# Patient Record
Sex: FEMALE
[De-identification: names, ages, dates, MRNs, and addresses within clinical notes are randomized per-mention and may not be internally consistent; named-entity substitution may affect disease eponyms.]

---

## 2017-01-25 ENCOUNTER — HOSPITAL (OUTPATIENT)
Dept: OTHER | Age: 52
End: 2017-01-25
Attending: PSYCHIATRY & NEUROLOGY

## 2017-01-26 ENCOUNTER — HOSPITAL (OUTPATIENT)
Dept: OTHER | Age: 52
End: 2017-01-26
Attending: PSYCHIATRY & NEUROLOGY

## 2017-02-01 ENCOUNTER — HOSPITAL (OUTPATIENT)
Dept: OTHER | Age: 52
End: 2017-02-01
Attending: PSYCHIATRY & NEUROLOGY

## 2017-03-03 ENCOUNTER — CHARTING TRANS (OUTPATIENT)
Dept: OTHER | Age: 52
End: 2017-03-03

## 2017-03-03 ASSESSMENT — PAIN SCALES - GENERAL: PAINLEVEL_OUTOF10: 5

## 2017-07-05 ENCOUNTER — OFFICE VISIT (OUTPATIENT)
Dept: INTERNAL MEDICINE CLINIC | Facility: HOSPITAL | Age: 52
End: 2017-07-05
Attending: EMERGENCY MEDICINE

## 2017-07-05 DIAGNOSIS — Z00.00 WELLNESS EXAMINATION: Primary | ICD-10-CM

## 2017-07-05 LAB
MUV IGG SER IA-ACNC: 198 AU/ML (ref 11–?)
VZV IGG SER IA-ACNC: 576.7 (ref 165–?)

## 2017-07-05 PROCEDURE — 86787 VARICELLA-ZOSTER ANTIBODY: CPT

## 2017-07-05 PROCEDURE — 86735 MUMPS ANTIBODY: CPT

## 2018-02-13 ENCOUNTER — OFFICE VISIT (OUTPATIENT)
Dept: FAMILY MEDICINE CLINIC | Facility: CLINIC | Age: 53
End: 2018-02-13

## 2018-02-13 VITALS
WEIGHT: 166 LBS | DIASTOLIC BLOOD PRESSURE: 88 MMHG | TEMPERATURE: 98 F | HEART RATE: 82 BPM | SYSTOLIC BLOOD PRESSURE: 130 MMHG | RESPIRATION RATE: 18 BRPM

## 2018-02-13 DIAGNOSIS — J02.9 PHARYNGITIS, UNSPECIFIED ETIOLOGY: Primary | ICD-10-CM

## 2018-02-13 LAB
CONTROL LINE PRESENT WITH A CLEAR BACKGROUND (YES/NO): YES YES/NO
STREP GRP A CUL-SCR: NEGATIVE

## 2018-02-13 PROCEDURE — 99203 OFFICE O/P NEW LOW 30 MIN: CPT | Performed by: NURSE PRACTITIONER

## 2018-02-13 PROCEDURE — 87880 STREP A ASSAY W/OPTIC: CPT | Performed by: NURSE PRACTITIONER

## 2018-02-13 RX ORDER — HYDROXYZINE 50 MG/1
50 TABLET, FILM COATED ORAL EVERY 6 HOURS PRN
COMMUNITY
Start: 2017-12-07 | End: 2018-11-16 | Stop reason: CLARIF

## 2018-02-13 RX ORDER — FLUOXETINE HYDROCHLORIDE 20 MG/1
60 CAPSULE ORAL NIGHTLY
COMMUNITY
Start: 2017-12-07 | End: 2018-12-06

## 2018-02-13 NOTE — PROGRESS NOTES
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 HPI:   Anthony Hensley is a 46year old female presents to clinic with complaint of sore throat. Patient has had for 5 days.    Patient reports fol EXTREMITIES: no cyanosis, clubbing or edema  LYMPH: no anterior cervical lymphadenopathy,       Recent Results (from the past 24 hour(s))  -STREP A ASSAY W/OPTIC   Collection Time: 02/13/18 10:04 AM   Result Value Ref Range   STREP GRP A CUL-SCR Negative N · Suck on throat lozenges, cough drops, hard candy, ice chips, or frozen fruit-juice bars. Use the sugar-free versions if your diet or medical condition requires them. Gargle to ease irritation  Gargling every hour or 2 can ease irritation.  Try gargling w

## 2018-11-05 VITALS
SYSTOLIC BLOOD PRESSURE: 130 MMHG | RESPIRATION RATE: 12 BRPM | WEIGHT: 158.1 LBS | DIASTOLIC BLOOD PRESSURE: 90 MMHG | HEIGHT: 66 IN | BODY MASS INDEX: 25.41 KG/M2 | HEART RATE: 80 BPM | TEMPERATURE: 98.3 F

## 2018-11-16 ENCOUNTER — APPOINTMENT (OUTPATIENT)
Dept: CT IMAGING | Facility: HOSPITAL | Age: 53
DRG: 392 | End: 2018-11-16
Attending: EMERGENCY MEDICINE
Payer: COMMERCIAL

## 2018-11-16 ENCOUNTER — HOSPITAL ENCOUNTER (INPATIENT)
Facility: HOSPITAL | Age: 53
LOS: 1 days | Discharge: HOME | DRG: 392 | End: 2018-11-19
Attending: EMERGENCY MEDICINE | Admitting: HOSPITALIST
Payer: COMMERCIAL

## 2018-11-16 ENCOUNTER — APPOINTMENT (OUTPATIENT)
Dept: GENERAL RADIOLOGY | Facility: HOSPITAL | Age: 53
DRG: 392 | End: 2018-11-16
Payer: COMMERCIAL

## 2018-11-16 DIAGNOSIS — R07.9 CHEST PAIN OF UNCERTAIN ETIOLOGY: Primary | ICD-10-CM

## 2018-11-16 PROBLEM — R79.89 AZOTEMIA: Status: ACTIVE | Noted: 2018-11-16

## 2018-11-16 PROCEDURE — 71045 X-RAY EXAM CHEST 1 VIEW: CPT | Performed by: EMERGENCY MEDICINE

## 2018-11-16 PROCEDURE — 99220 INITIAL OBSERVATION CARE,LEVL III: CPT | Performed by: HOSPITALIST

## 2018-11-16 PROCEDURE — 71275 CT ANGIOGRAPHY CHEST: CPT | Performed by: EMERGENCY MEDICINE

## 2018-11-16 RX ORDER — ENOXAPARIN SODIUM 100 MG/ML
40 INJECTION SUBCUTANEOUS DAILY
Status: DISCONTINUED | OUTPATIENT
Start: 2018-11-16 | End: 2018-11-19

## 2018-11-16 RX ORDER — ASPIRIN 81 MG/1
324 TABLET, CHEWABLE ORAL ONCE
Status: COMPLETED | OUTPATIENT
Start: 2018-11-16 | End: 2018-11-16

## 2018-11-16 RX ORDER — BUPROPION HYDROCHLORIDE 100 MG/1
300 TABLET ORAL ONCE
Status: DISCONTINUED | OUTPATIENT
Start: 2018-11-17 | End: 2018-11-16

## 2018-11-16 RX ORDER — METOCLOPRAMIDE HYDROCHLORIDE 5 MG/ML
10 INJECTION INTRAMUSCULAR; INTRAVENOUS EVERY 8 HOURS PRN
Status: DISCONTINUED | OUTPATIENT
Start: 2018-11-16 | End: 2018-11-19

## 2018-11-16 RX ORDER — BUPROPION HYDROCHLORIDE 150 MG/1
150 TABLET, EXTENDED RELEASE ORAL 2 TIMES DAILY
Status: DISCONTINUED | OUTPATIENT
Start: 2018-11-16 | End: 2018-11-19

## 2018-11-16 RX ORDER — SODIUM CHLORIDE 9 MG/ML
INJECTION, SOLUTION INTRAVENOUS CONTINUOUS
Status: ACTIVE | OUTPATIENT
Start: 2018-11-16 | End: 2018-11-16

## 2018-11-16 RX ORDER — FAMOTIDINE 20 MG/1
20 TABLET ORAL 2 TIMES DAILY
Status: DISCONTINUED | OUTPATIENT
Start: 2018-11-16 | End: 2018-11-17

## 2018-11-16 RX ORDER — BUPROPION HYDROCHLORIDE 100 MG/1
300 TABLET ORAL ONCE
COMMUNITY
End: 2018-11-16 | Stop reason: CLARIF

## 2018-11-16 RX ORDER — ASPIRIN 325 MG
325 TABLET ORAL DAILY
Status: DISCONTINUED | OUTPATIENT
Start: 2018-11-17 | End: 2018-11-19

## 2018-11-16 RX ORDER — ONDANSETRON 2 MG/ML
4 INJECTION INTRAMUSCULAR; INTRAVENOUS EVERY 4 HOURS PRN
Status: DISCONTINUED | OUTPATIENT
Start: 2018-11-16 | End: 2018-11-16

## 2018-11-16 RX ORDER — ONDANSETRON 2 MG/ML
4 INJECTION INTRAMUSCULAR; INTRAVENOUS EVERY 6 HOURS PRN
Status: DISCONTINUED | OUTPATIENT
Start: 2018-11-16 | End: 2018-11-19

## 2018-11-16 RX ORDER — METOPROLOL TARTRATE 5 MG/5ML
5 INJECTION INTRAVENOUS ONCE
Status: DISCONTINUED | OUTPATIENT
Start: 2018-11-16 | End: 2018-11-19

## 2018-11-16 RX ORDER — ACETAMINOPHEN 325 MG/1
650 TABLET ORAL EVERY 6 HOURS PRN
Status: DISCONTINUED | OUTPATIENT
Start: 2018-11-16 | End: 2018-11-19

## 2018-11-16 RX ORDER — BUPROPION HYDROCHLORIDE 300 MG/1
300 TABLET ORAL NIGHTLY
COMMUNITY
End: 2019-02-22

## 2018-11-16 RX ORDER — FLUOXETINE HYDROCHLORIDE 20 MG/1
20 CAPSULE ORAL DAILY
Status: DISCONTINUED | OUTPATIENT
Start: 2018-11-16 | End: 2018-11-19

## 2018-11-16 RX ORDER — NITROGLYCERIN 0.4 MG/1
0.4 TABLET SUBLINGUAL EVERY 5 MIN PRN
Status: DISCONTINUED | OUTPATIENT
Start: 2018-11-16 | End: 2018-11-19

## 2018-11-16 NOTE — ED INITIAL ASSESSMENT (HPI)
Patient presents with complaints of chest pain for a few days. She also reports she has been hypertensive at home as well. She describes the pain as a pressure sensation and almost a sensation of foreign body in her throat.  She states she recently had stre

## 2018-11-16 NOTE — ED PROVIDER NOTES
Patient Seen in: [facility] Emergency Department    History   Patient presents with:  Chest Pain Angina (cardiovascular)    Stated Complaint: chest pain    HPI  This is a 59-year-old female who arrives here with complaints of chest pain.   The patien (!) 168/84   Pulse 79   Temp 98.7 °F (37.1 °C) (Temporal)   Resp 15   Ht 165.1 cm (5' 5\")   Wt 70.3 kg   SpO2 100%   BMI 25.79 kg/m²         Physical Exam    General: . Male no respiratory distress  The patient is in no respiratory distress.  The patient ---------                               -----------         ------                     CBC W/ DIFFERENTIAL[[id number]]          Abnormal            Final result                 Please view results for these tests on the individual orders. BONES:  No acute fractures. Mild hypertrophic spurring of the thoracic spine. CONCLUSION:  1. No acute pulmonary embolus 2. There is a 4 mm right upper lobe nodule. If patient is at low risk, no further workup is needed.  If patient is at high risk

## 2018-11-17 PROCEDURE — 99225 SUBSEQUENT OBSERVATION CARE: CPT | Performed by: STUDENT IN AN ORGANIZED HEALTH CARE EDUCATION/TRAINING PROGRAM

## 2018-11-17 RX ORDER — PANTOPRAZOLE SODIUM 40 MG/1
40 TABLET, DELAYED RELEASE ORAL
Status: DISCONTINUED | OUTPATIENT
Start: 2018-11-17 | End: 2018-11-19

## 2018-11-17 RX ORDER — MAGNESIUM HYDROXIDE/ALUMINUM HYDROXICE/SIMETHICONE 120; 1200; 1200 MG/30ML; MG/30ML; MG/30ML
30 SUSPENSION ORAL 4 TIMES DAILY PRN
Status: DISCONTINUED | OUTPATIENT
Start: 2018-11-17 | End: 2018-11-19

## 2018-11-17 RX ORDER — MELATONIN
3 NIGHTLY
Status: DISCONTINUED | OUTPATIENT
Start: 2018-11-17 | End: 2018-11-17

## 2018-11-17 RX ORDER — MELATONIN
3 NIGHTLY PRN
Status: DISCONTINUED | OUTPATIENT
Start: 2018-11-17 | End: 2018-11-19

## 2018-11-17 NOTE — PLAN OF CARE
Difficult to discern whether she is experiencing chest pain or epigastric GI pain.   Pain was a 5/10 this morning and given the nature that she was nauseous with it I thought it was best to offer her the Marajesh Lucas ordered and Zofran instead of fantasma

## 2018-11-17 NOTE — CONSULTS
[facility]  Report of Consultation    Paula Yanes Patient Status:  Observation    1965 MRN [id number]   [facility] 8NE-A Attending Nanci Funes MD   Hosp Day # 0 PCP No primary care provider on file.      Reason for Consult mg, 20 mg, Oral, Daily  •  aspirin tab 325 mg, 325 mg, Oral, Daily  •  nitroGLYCERIN (NITROSTAT) SL tab 0.4 mg, 0.4 mg, Sublingual, Q5 Min PRN  •  Enoxaparin Sodium (LOVENOX) 40 MG/0.4ML injection 40 mg, 40 mg, Subcutaneous, Daily  •  acetaminophen (TYLENO CA 8.9 11/17/2018    ALB 3.8 11/16/2018    ALKPHO 61 11/16/2018    BILT 0.5 11/16/2018    TP 7.6 11/16/2018    AST 20 11/16/2018    ALT 33 11/16/2018    TSH 0.740 11/17/2018     11/17/2018    DDIMER 0.51 11/16/2018    TROP <0.046 11/17/2018     A

## 2018-11-17 NOTE — PROGRESS NOTES
[facility]  Progress Note     Rhea Hendrix Patient Status:  Observation    1965 MRN [id number]   [facility] 8NE-A Attending Dung Coelho MD   Hosp Day # 0 PCP No primary care provider on file.      Chief Complaint: CP/ epiga Blocker)   • Pantoprazole Sodium  40 mg Oral QAM AC   • metoprolol Tartrate  5 mg Intravenous Once   • FLUoxetine HCl  20 mg Oral Daily   • aspirin  325 mg Oral Daily   • enoxaparin  40 mg Subcutaneous Daily   • famoTIDine  20 mg Oral BID   • BuPROPion HCl

## 2018-11-17 NOTE — H&P
[facility]  History and Physical     Melba Peabody Patient Status:  Emergency    1965 MRN [id number]   Location [address] Attending Nir Yuan MD   Hosp Day # 0 PCP No primary care provider on file.      C (70.3 kg)   SpO2 100%   BMI 25.79 kg/m²   General: No acute distress. Alert and oriented x 3. HEENT: Normocephalic atraumatic. Moist mucous membranes. EOM-I. PERRLA. Anicteric. Neck: No lymphadenopathy. No JVD. No carotid bruits.   Respiratory: Clear to a

## 2018-11-18 ENCOUNTER — APPOINTMENT (OUTPATIENT)
Dept: CV DIAGNOSTICS | Facility: HOSPITAL | Age: 53
DRG: 392 | End: 2018-11-18
Attending: INTERNAL MEDICINE
Payer: COMMERCIAL

## 2018-11-18 ENCOUNTER — APPOINTMENT (OUTPATIENT)
Dept: ULTRASOUND IMAGING | Facility: HOSPITAL | Age: 53
DRG: 392 | End: 2018-11-18
Attending: INTERNAL MEDICINE
Payer: COMMERCIAL

## 2018-11-18 PROCEDURE — 99225 SUBSEQUENT OBSERVATION CARE: CPT | Performed by: STUDENT IN AN ORGANIZED HEALTH CARE EDUCATION/TRAINING PROGRAM

## 2018-11-18 PROCEDURE — 93306 TTE W/DOPPLER COMPLETE: CPT | Performed by: INTERNAL MEDICINE

## 2018-11-18 PROCEDURE — 76700 US EXAM ABDOM COMPLETE: CPT | Performed by: INTERNAL MEDICINE

## 2018-11-18 NOTE — PROGRESS NOTES
[facility]  Progress Note    Earl Govea Patient Status:  Observation    1965 MRN [id number]   [facility] 8NE-A Attending Breezy Green MD   Hosp Day # 0 PCP No primary care provider on file.        Assessment and Plan:  Kamilla Clear  Abdomen: Soft, non-tender. Extremities: no edema  Neurologic: Alert and oriented, normal affect. Skin: Warm and dry.      Laboratory/Data:    Labs:       Medications:    Current Facility-Administered Medications:  Alum & Mag Hydroxide-Simeth (MAAL

## 2018-11-18 NOTE — CONSULTS
[address]  Report of GI Consultation    Chen Rossi Patient Status:  Observation    1965 MRN [id number]   [facility] 8NE-A Attending Ortiz Orellana MD   Hosp Day # 0 PCP No primary care provider on file.      Date of Admission: (NITROSTAT) SL tab 0.4 mg 0.4 mg Sublingual Q5 Min PRN   Enoxaparin Sodium (LOVENOX) 40 MG/0.4ML injection 40 mg 40 mg Subcutaneous Daily   acetaminophen (TYLENOL) tab 650 mg 650 mg Oral Q6H PRN   ondansetron HCl (ZOFRAN) injection 4 mg 4 mg Intravenous Q6 4.2  5.1       Imaging:  Ct Angiography, Chest (cpt=71275)    Result Date: 11/16/2018  CONCLUSION:  1. No acute pulmonary embolus 2. There is a 4 mm right upper lobe nodule. If patient is at low risk, no further workup is needed.  If patient is at high r

## 2018-11-18 NOTE — PROGRESS NOTES
Spoke to Dr. Evelio Portillo regarding results of abdominal US. Relayed the results of gallbladder polyps, largest measuring of 5mm and fatty infiltration of the liver to Dr. Tato Yepez as requested. Gretchen has no further recommendations from his standpoint.   She can h

## 2018-11-18 NOTE — PLAN OF CARE
Pt denies pain. Pt is A & O x 4. Pt is NSR on tele, S1 and S2 present, heart rate steady and pt denies cardiac symptoms. Lungs are clear bilaterally in upper and lower lobes. Abdomen soft, non-tender and round.  Last BM on 11/17/18 and continuing to monitor

## 2018-11-19 ENCOUNTER — APPOINTMENT (OUTPATIENT)
Dept: CV DIAGNOSTICS | Facility: HOSPITAL | Age: 53
DRG: 392 | End: 2018-11-19
Attending: INTERNAL MEDICINE
Payer: COMMERCIAL

## 2018-11-19 ENCOUNTER — APPOINTMENT (OUTPATIENT)
Dept: CT IMAGING | Facility: HOSPITAL | Age: 53
DRG: 392 | End: 2018-11-19
Attending: NURSE PRACTITIONER
Payer: COMMERCIAL

## 2018-11-19 VITALS
DIASTOLIC BLOOD PRESSURE: 72 MMHG | HEIGHT: 65 IN | TEMPERATURE: 98 F | OXYGEN SATURATION: 98 % | SYSTOLIC BLOOD PRESSURE: 133 MMHG | HEART RATE: 58 BPM | WEIGHT: 176.56 LBS | BODY MASS INDEX: 29.42 KG/M2 | RESPIRATION RATE: 18 BRPM

## 2018-11-19 PROCEDURE — 75574 CT ANGIO HRT W/3D IMAGE: CPT | Performed by: NURSE PRACTITIONER

## 2018-11-19 PROCEDURE — 78452 HT MUSCLE IMAGE SPECT MULT: CPT | Performed by: INTERNAL MEDICINE

## 2018-11-19 PROCEDURE — 99239 HOSP IP/OBS DSCHRG MGMT >30: CPT | Performed by: STUDENT IN AN ORGANIZED HEALTH CARE EDUCATION/TRAINING PROGRAM

## 2018-11-19 PROCEDURE — 93017 CV STRESS TEST TRACING ONLY: CPT | Performed by: INTERNAL MEDICINE

## 2018-11-19 PROCEDURE — 93018 CV STRESS TEST I&R ONLY: CPT | Performed by: INTERNAL MEDICINE

## 2018-11-19 RX ORDER — METOPROLOL TARTRATE 100 MG/1
100 TABLET ORAL ONCE AS NEEDED
Status: COMPLETED | OUTPATIENT
Start: 2018-11-19 | End: 2018-11-19

## 2018-11-19 RX ORDER — METOPROLOL TARTRATE 5 MG/5ML
5 INJECTION INTRAVENOUS
Status: DISCONTINUED | OUTPATIENT
Start: 2018-11-19 | End: 2018-11-19

## 2018-11-19 RX ORDER — METOPROLOL TARTRATE 100 MG/1
100 TABLET ORAL ONCE
Status: DISCONTINUED | OUTPATIENT
Start: 2018-11-19 | End: 2018-11-19

## 2018-11-19 RX ORDER — ALPRAZOLAM 0.25 MG/1
0.25 TABLET ORAL ONCE AS NEEDED
Status: DISCONTINUED | OUTPATIENT
Start: 2018-11-20 | End: 2018-11-19

## 2018-11-19 RX ORDER — NITROGLYCERIN 0.4 MG/1
TABLET SUBLINGUAL
Status: COMPLETED
Start: 2018-11-19 | End: 2018-11-19

## 2018-11-19 RX ORDER — ALPRAZOLAM 0.25 MG/1
0.25 TABLET ORAL ONCE
Status: COMPLETED | OUTPATIENT
Start: 2018-11-19 | End: 2018-11-19

## 2018-11-19 RX ORDER — ATORVASTATIN CALCIUM 20 MG/1
20 TABLET, FILM COATED ORAL NIGHTLY
Qty: 30 TABLET | Refills: 5 | Status: SHIPPED | OUTPATIENT
Start: 2018-11-19

## 2018-11-19 RX ORDER — DILTIAZEM HYDROCHLORIDE 5 MG/ML
10 INJECTION INTRAVENOUS SEE ADMIN INSTRUCTIONS
Status: DISCONTINUED | OUTPATIENT
Start: 2018-11-19 | End: 2018-11-19 | Stop reason: HOSPADM

## 2018-11-19 RX ORDER — PANTOPRAZOLE SODIUM 40 MG/1
40 TABLET, DELAYED RELEASE ORAL
Qty: 30 TABLET | Refills: 1 | Status: SHIPPED | OUTPATIENT
Start: 2018-11-19 | End: 2019-01-18

## 2018-11-19 RX ORDER — METOPROLOL TARTRATE 50 MG/1
50 TABLET, FILM COATED ORAL ONCE AS NEEDED
Status: COMPLETED | OUTPATIENT
Start: 2018-11-19 | End: 2018-11-19

## 2018-11-19 RX ORDER — ASPIRIN 81 MG/1
81 TABLET ORAL DAILY
Qty: 30 TABLET | Refills: 5 | Status: SHIPPED | OUTPATIENT
Start: 2018-11-19

## 2018-11-19 RX ORDER — METOPROLOL TARTRATE 5 MG/5ML
INJECTION INTRAVENOUS
Status: DISCONTINUED
Start: 2018-11-19 | End: 2018-11-19 | Stop reason: WASHOUT

## 2018-11-19 RX ORDER — NITROGLYCERIN 0.4 MG/1
0.4 TABLET SUBLINGUAL ONCE
Status: COMPLETED | OUTPATIENT
Start: 2018-11-19 | End: 2018-11-19

## 2018-11-19 RX ORDER — NITROGLYCERIN 0.4 MG/1
0.4 TABLET SUBLINGUAL ONCE
Status: DISCONTINUED | OUTPATIENT
Start: 2018-11-19 | End: 2018-11-19 | Stop reason: HOSPADM

## 2018-11-19 RX ORDER — METOPROLOL TARTRATE 50 MG/1
50 TABLET, FILM COATED ORAL ONCE
Status: DISCONTINUED | OUTPATIENT
Start: 2018-11-20 | End: 2018-11-19

## 2018-11-19 RX ORDER — METOPROLOL TARTRATE 5 MG/5ML
5 INJECTION INTRAVENOUS SEE ADMIN INSTRUCTIONS
Status: DISCONTINUED | OUTPATIENT
Start: 2018-11-19 | End: 2018-11-19 | Stop reason: HOSPADM

## 2018-11-19 RX ORDER — METOPROLOL TARTRATE 50 MG/1
50 TABLET, FILM COATED ORAL ONCE
Status: DISCONTINUED | OUTPATIENT
Start: 2018-11-19 | End: 2018-11-19

## 2018-11-19 RX ORDER — METOPROLOL TARTRATE 100 MG/1
100 TABLET ORAL ONCE
Status: DISCONTINUED | OUTPATIENT
Start: 2018-11-20 | End: 2018-11-19

## 2018-11-19 RX ORDER — METOPROLOL TARTRATE 50 MG/1
50 TABLET, FILM COATED ORAL ONCE AS NEEDED
Status: DISCONTINUED | OUTPATIENT
Start: 2018-11-20 | End: 2018-11-19

## 2018-11-19 RX ORDER — MIDAZOLAM HYDROCHLORIDE 1 MG/ML
1 INJECTION INTRAMUSCULAR; INTRAVENOUS ONCE
Status: DISCONTINUED | OUTPATIENT
Start: 2018-11-19 | End: 2018-11-19

## 2018-11-19 NOTE — IMAGING NOTE
Olga Lidia Pickard RN instructed pt can eat, drink plenty of water, hold caffeine (decaf and chocolate) x 12 hours, may take all meds (including beta blockers), instructed RN to screen for claustrophobia/anxiety and give xanax if needed now, approximate time for scan

## 2018-11-19 NOTE — PAYOR COMM NOTE
--------------  ADMISSION REVIEW     Payor: Dottie HUTTON EPO  Subscriber #:  [id number]  Authorization Number: N/A    Admit date: 11/19/18  Admit time: 8382       Admitting Physician: Bhumika Rose MD  Attending Physician:  Katt Muñoz MD  [facility] Exam     ED Triage Vitals   BP 11/16/18 1739 (!) 180/98   Pulse 11/16/18 1739 81   Resp 11/16/18 1739 14   Temp 11/16/18 1743 98.7 °F (37.1 °C)   Temp src 11/16/18 1743 Temporal   SpO2 11/16/18 1739 100 %   O2 Device 11/16/18 1739 None (Room air)     Allie are some nonspecific ST changes anterior laterally    Admission disposition: 11/16/2018  7:26 PM  The patient's troponin was negative, comprehensive shows no acute pathology CBC was normal, d-dimer was borderline elevated therefore CT chest was ordered diagnosis)    Disposition:  Admit  11/16/2018  7:26 pm    Present on Admission  Date Reviewed: 2/13/2018          ICD-10-CM Noted POA    Azotemia R79.89 11/16/2018 Yes    Chest pain of uncertain etiology E63.79 11/16/2018 Unknown     Signed by Brown Malagon noted in the HPI. Physical Exam:    BP (!) 168/84   Pulse 79   Temp 98.7 °F (37.1 °C) (Temporal)   Resp 15   Ht 165.1 cm (5' 5\")   Wt 155 lb (70.3 kg)   SpO2 100%   BMI 25.79 kg/m²    General: No acute distress. Alert and oriented x 3.   HEENT: Normocep pulse 70, temperature 98.2 °F (36.8 °C), temperature source Oral, resp. rate 16, height 65\", weight 175 lb 14.8 oz, SpO2 98 %  Impression:   1. Epigastric pain, post-prandial, improving with PPI  2. Chest pain     Recommendations:  3.  Tolerating a regular tab 50 mg     Date Action Dose Route User    11/19/2018 1514 Given 50 mg Oral Alexa Montano, RN      nitroGLYCERIN (NITROSTAT) SL tab 0.4 mg     Date Action Dose Route User    11/19/2018 1629 Given 0.4 mg Sublingual Juan Pham, RN      Pantoprazole

## 2018-11-19 NOTE — PLAN OF CARE
Pt denies pain. Pt is A & O x 4. Pt is NSR/ SB on tele, S1 and S2 present, heart rate steady and pt denies cardiac symptoms. Lungs clear bilaterally in upper and lower lobes. Abdomen soft, non-tender and round.  Last BM on 11/17/18 and continuing to monitor

## 2018-11-19 NOTE — PROGRESS NOTES
[facility]  Progress Note     Matt Rivero Patient Status:  Observation    1965 MRN [id number]   [facility] 8NE-A Attending Mazin Erickson MD   Hosp Day # 0 PCP No primary care provider on file.      Chief Complaint: CP/ epiga HCl  20 mg Oral Daily   • aspirin  325 mg Oral Daily   • enoxaparin  40 mg Subcutaneous Daily   • BuPROPion HCl ER (SR)  150 mg Oral BID       ASSESSMENT / PLAN:     1. Chest pain vs abdominal pain  2. Hypertension     Plan of care:    If stress test withou

## 2018-11-19 NOTE — PROGRESS NOTES
[facility]  Progress Note     Paula Yanes Patient Status:  Observation    1965 MRN [id number]   [facility] 8NE-A Attending Nanci Funes MD   Hosp Day # 0 PCP No primary care provider on file.      Chief Complaint: CP/ epiga metoprolol Tartrate  5 mg Intravenous Once   • FLUoxetine HCl  20 mg Oral Daily   • aspirin  325 mg Oral Daily   • enoxaparin  40 mg Subcutaneous Daily   • BuPROPion HCl ER (SR)  150 mg Oral BID       ASSESSMENT / PLAN:     1.  Chest pain vs abdominal pain

## 2018-11-19 NOTE — BH PROGRESS NOTE
[address] [facility] Resource Referral Counselor Note    [facility] Patient Status:  Inpatient    1965 MRN [id number]   [facility] 8NE-A Attending Morales Moore MD   Hosp Day # 0 PCP No primary care provider on file.        S(subje

## 2018-11-19 NOTE — DIETARY NOTE
Nutrition Short Note    Dietitian consult received per heart failure standing order. Discussed sodium and heart healthy diet guidelines; foods to avoid, seasoning without salt, food labels and eating out appropriately. Pt receptive.  Pt verbalizes Levell Hunger

## 2018-11-19 NOTE — PROGRESS NOTES
[facility]  Cardiology Progress Note    Subjective:  No chest pain or shortness of breath.     Objective:  /66 (BP Location: Left arm)   Pulse 55   Temp 97.7 °F (36.5 °C) (Oral)   Resp 18   Ht 5' 5\" (1.651 m)   Wt 176 lb 9.4 oz (80.1 kg)   SpO2 today  · CTA of the coronaries    ABHISHEK Hughes  11/19/2018  8:41 AM

## 2018-11-19 NOTE — PLAN OF CARE
CARDIOVASCULAR - ADULT    • Maintains optimal cardiac output and hemodynamic stability Progressing    • Absence of cardiac arrhythmias or at baseline Progressing    NSR/SB on telemetry. VSS. No c/o cardiac symptoms. No c/o pain this P.M.   Plan for stres

## 2018-11-19 NOTE — PLAN OF CARE
Results from coronary CT are negative for any significant CAD. I reviewed these results with dr Dalia Edwards and dr Valerio David. Patient can be discharged home and encouraged to follow up with a cardiologist in the [place] [address] office in 2 weeks.

## 2018-11-20 NOTE — PROGRESS NOTES
Discharge Note:    Pt discharged home. IV removed, tele D/C, and returned to monitor tech. F/U instructions provided and discussed. Pt and family verbalized understanding. Prescriptions given.  Discussed adverse reactions and side effects of all new medicat

## 2018-11-29 NOTE — DISCHARGE SUMMARY
[facility]  DISCHARGE SUMMARY     Cierra Nesbitt Patient Status:  Inpatient    1965 MRN [id number]   [facility] 8NE-A Attending No att. providers found   [facility] Day # 1 PCP No primary care provider on file.      Date of Admission: atorvastatin 20 MG Tabs  Commonly known as:  LIPITOR      Take 1 tablet (20 mg total) by mouth nightly.    Quantity:  30 tablet  Refills:  5     metoprolol Tartrate 25 MG Tabs  Commonly known as:  LOPRESSOR      Take 1 tablet (25 mg total) by mouth 2x Fabian Fernandez nontender, nondistended. Positive bowel sounds. No rebound or guarding. Neurologic: No focal neurological deficits. Musculoskeletal: Moves all extremities. Extremities: No edema.   -----------------------------------------------------------------------

## 2018-12-06 PROCEDURE — 88175 CYTOPATH C/V AUTO FLUID REDO: CPT | Performed by: FAMILY MEDICINE

## 2018-12-06 NOTE — PROGRESS NOTES
HPI:   Patient presents with:  Pap: New pt presents to clinic for pap  Other: has colonoscopy scheduled for January 3, 2018, needs mammogram order      Cuong Santillan is a 48year old female presenting for:  Pap smear >5yrs, no hx of abnormal  Mammogra EC Take 1 tablet (81 mg total) by mouth daily. Disp: 30 tablet Rfl: 5   metoprolol Tartrate 25 MG Oral Tab Take 1 tablet (25 mg total) by mouth 2x Daily(Beta Blocker).  Disp: 60 tablet Rfl: 5   atorvastatin 20 MG Oral Tab Take 1 tablet (20 mg total) by mout heard.  Pulmonary/Chest: Effort normal and breath sounds normal. No respiratory distress. Abdominal: Soft. Bowel sounds are normal. She exhibits no distension. There is no tenderness. Genitourinary: Vagina normal. There is no rash on the right labia.  Marie Sosa capsule 0     Sig: Take 2 capsules (80 mg total) by mouth nightly.        Orders Placed This Encounter      ThinPrep PAP with HPV Reflex Request      ThinPrep PAP with HPV Reflex Request      Imaging & Consults:  [facility] SCREENING BILAT (CPT=77067)    [facility]

## 2018-12-06 NOTE — PATIENT INSTRUCTIONS
DASH DIET    DASH is a flexible and balanced eating plan that helps create a heart-healthy eating style for life. The DASH eating plan requires no special foods and instead provides daily and weekly nutritional goals.  This plan recommends:  • Eating veget

## 2018-12-09 PROBLEM — F41.1 GAD (GENERALIZED ANXIETY DISORDER): Status: ACTIVE | Noted: 2018-12-09

## 2018-12-13 ENCOUNTER — HOSPITAL ENCOUNTER (EMERGENCY)
Facility: HOSPITAL | Age: 53
Discharge: HOME | End: 2018-12-13
Attending: EMERGENCY MEDICINE
Payer: COMMERCIAL

## 2018-12-13 ENCOUNTER — APPOINTMENT (OUTPATIENT)
Dept: CT IMAGING | Facility: HOSPITAL | Age: 53
End: 2018-12-13
Attending: EMERGENCY MEDICINE
Payer: COMMERCIAL

## 2018-12-13 VITALS
DIASTOLIC BLOOD PRESSURE: 83 MMHG | OXYGEN SATURATION: 98 % | WEIGHT: 160 LBS | RESPIRATION RATE: 16 BRPM | SYSTOLIC BLOOD PRESSURE: 147 MMHG | HEART RATE: 63 BPM | HEIGHT: 65 IN | TEMPERATURE: 98 F | BODY MASS INDEX: 26.66 KG/M2

## 2018-12-13 DIAGNOSIS — R10.13 EPIGASTRIC PAIN: Primary | ICD-10-CM

## 2018-12-13 PROCEDURE — 96361 HYDRATE IV INFUSION ADD-ON: CPT

## 2018-12-13 PROCEDURE — 85025 COMPLETE CBC W/AUTO DIFF WBC: CPT | Performed by: EMERGENCY MEDICINE

## 2018-12-13 PROCEDURE — 83690 ASSAY OF LIPASE: CPT | Performed by: EMERGENCY MEDICINE

## 2018-12-13 PROCEDURE — 74177 CT ABD & PELVIS W/CONTRAST: CPT | Performed by: EMERGENCY MEDICINE

## 2018-12-13 PROCEDURE — 99285 EMERGENCY DEPT VISIT HI MDM: CPT

## 2018-12-13 PROCEDURE — 80053 COMPREHEN METABOLIC PANEL: CPT | Performed by: EMERGENCY MEDICINE

## 2018-12-13 PROCEDURE — 96360 HYDRATION IV INFUSION INIT: CPT

## 2018-12-13 RX ORDER — SODIUM CHLORIDE 9 MG/ML
125 INJECTION, SOLUTION INTRAVENOUS CONTINUOUS
Status: DISCONTINUED | OUTPATIENT
Start: 2018-12-13 | End: 2018-12-13

## 2018-12-13 RX ORDER — SUCRALFATE ORAL 1 G/10ML
1 SUSPENSION ORAL
Qty: 400 ML | Refills: 0 | Status: SHIPPED | OUTPATIENT
Start: 2018-12-13 | End: 2018-12-23

## 2018-12-13 NOTE — ED INITIAL ASSESSMENT (HPI)
Pt presents with midepigastric pain startting 3 weeks ago seen here in er had cp workup, pt has egd scheduled for Jan, +nausea

## 2018-12-13 NOTE — ED PROVIDER NOTES
Patient Seen in: [facility] Emergency Department    History   Patient presents with:  Abdomen/Flank Pain (GI/)    Stated Complaint: Epigastric pain, here 3 weeks ago for the same reason.      HPI    49-year-old female presents emergency with chief Systems    Positive for stated complaint: Epigastric pain, here 3 weeks ago for the same reason. Other systems are as noted in HPI. Constitutional and vital signs reviewed. All other systems reviewed and negative except as noted above.     Physical results for these tests on the individual orders. RAINBOW DRAW BLUE   RAINBOW DRAW LAVENDER   RAINBOW DRAW LIGHT GREEN   RAINBOW DRAW GOLD   CBC W/ DIFFERENTIAL                MDM   Patient IV established, blood work obtained.   Patient had CT performed w [id number]  [phone]    Call in 1 day          Medications Prescribed:  Current Discharge Medication List    START taking these medications    sucralfate 1 GM/10ML Oral Suspension  Take 10 mL (1 g total) by mouth 4 (four) times daily before meals and nightl

## 2018-12-20 PROBLEM — D12.4 BENIGN NEOPLASM OF DESCENDING COLON: Status: ACTIVE | Noted: 2018-12-20

## 2018-12-20 PROBLEM — R13.10 DYSPHAGIA: Status: ACTIVE | Noted: 2018-12-20

## 2018-12-20 PROBLEM — R10.13 EPIGASTRIC PAIN: Status: ACTIVE | Noted: 2018-12-20

## 2018-12-20 PROBLEM — D12.0 BENIGN NEOPLASM OF CECUM: Status: ACTIVE | Noted: 2018-12-20

## 2018-12-20 PROBLEM — D12.2 BENIGN NEOPLASM OF ASCENDING COLON: Status: ACTIVE | Noted: 2018-12-20

## 2018-12-20 PROBLEM — Z12.11 SPECIAL SCREENING FOR MALIGNANT NEOPLASM OF COLON: Status: ACTIVE | Noted: 2018-12-20

## 2019-02-07 ENCOUNTER — APPOINTMENT (OUTPATIENT)
Dept: GENERAL RADIOLOGY | Facility: HOSPITAL | Age: 54
End: 2019-02-07
Attending: EMERGENCY MEDICINE
Payer: COMMERCIAL

## 2019-02-07 ENCOUNTER — HOSPITAL ENCOUNTER (EMERGENCY)
Facility: HOSPITAL | Age: 54
Discharge: HOME | End: 2019-02-07
Attending: EMERGENCY MEDICINE
Payer: COMMERCIAL

## 2019-02-07 VITALS
OXYGEN SATURATION: 99 % | RESPIRATION RATE: 14 BRPM | HEART RATE: 60 BPM | WEIGHT: 160 LBS | SYSTOLIC BLOOD PRESSURE: 166 MMHG | HEIGHT: 65 IN | DIASTOLIC BLOOD PRESSURE: 89 MMHG | TEMPERATURE: 99 F | BODY MASS INDEX: 26.66 KG/M2

## 2019-02-07 DIAGNOSIS — I10 ESSENTIAL HYPERTENSION: Primary | ICD-10-CM

## 2019-02-07 DIAGNOSIS — R07.89 CHEST PRESSURE: ICD-10-CM

## 2019-02-07 LAB
ANION GAP SERPL CALC-SCNC: 8 MMOL/L (ref 0–18)
BASOPHILS # BLD AUTO: 0.03 X10(3) UL (ref 0–0.2)
BASOPHILS NFR BLD AUTO: 0.6 %
BUN SERPL-MCNC: 9 MG/DL (ref 8–20)
BUN/CREAT SERPL: 12 (ref 10–20)
CALCIUM SERPL-MCNC: 9.3 MG/DL (ref 8.5–10.5)
CHLORIDE SERPL-SCNC: 102 MMOL/L (ref 95–110)
CO2 SERPL-SCNC: 25 MMOL/L (ref 22–32)
CREAT SERPL-MCNC: 0.75 MG/DL (ref 0.5–1.5)
DEPRECATED RDW RBC AUTO: 43.2 FL (ref 35.1–46.3)
EOSINOPHIL # BLD AUTO: 0.04 X10(3) UL (ref 0–0.7)
EOSINOPHIL NFR BLD AUTO: 0.8 %
ERYTHROCYTE [DISTWIDTH] IN BLOOD BY AUTOMATED COUNT: 11.8 % (ref 11–15)
GLUCOSE SERPL-MCNC: 101 MG/DL (ref 70–99)
HCT VFR BLD AUTO: 40 % (ref 35–48)
HGB BLD-MCNC: 13.4 G/DL (ref 12–16)
IMM GRANULOCYTES # BLD AUTO: 0.01 X10(3) UL (ref 0–1)
IMM GRANULOCYTES NFR BLD: 0.2 %
LYMPHOCYTES # BLD AUTO: 1.14 X10(3) UL (ref 1–4)
LYMPHOCYTES NFR BLD AUTO: 22.1 %
MCH RBC QN AUTO: 33.4 PG (ref 26–34)
MCHC RBC AUTO-ENTMCNC: 33.5 G/DL (ref 31–37)
MCV RBC AUTO: 99.8 FL (ref 80–100)
MONOCYTES # BLD AUTO: 0.29 X10(3) UL (ref 0.1–1)
MONOCYTES NFR BLD AUTO: 5.6 %
NEUTROPHILS # BLD AUTO: 3.66 X10 (3) UL (ref 1.5–7.7)
NEUTROPHILS # BLD AUTO: 3.66 X10(3) UL (ref 1.5–7.7)
NEUTROPHILS NFR BLD AUTO: 70.7 %
OSMOLALITY UR CALC.SUM OF ELEC: 279 MOSM/KG (ref 275–295)
PLATELET # BLD AUTO: 234 10(3)UL (ref 150–450)
POTASSIUM SERPL-SCNC: 4.4 MMOL/L (ref 3.3–5.1)
RBC # BLD AUTO: 4.01 X10(6)UL (ref 3.8–5.3)
SODIUM SERPL-SCNC: 135 MMOL/L (ref 136–144)
TROPONIN I SERPL-MCNC: 0 NG/ML (ref ?–0.03)
TROPONIN I SERPL-MCNC: 0 NG/ML (ref ?–0.03)
WBC # BLD AUTO: 5.2 X10(3) UL (ref 4–11)

## 2019-02-07 PROCEDURE — 85025 COMPLETE CBC W/AUTO DIFF WBC: CPT | Performed by: EMERGENCY MEDICINE

## 2019-02-07 PROCEDURE — 96374 THER/PROPH/DIAG INJ IV PUSH: CPT

## 2019-02-07 PROCEDURE — 93005 ELECTROCARDIOGRAM TRACING: CPT

## 2019-02-07 PROCEDURE — 93010 ELECTROCARDIOGRAM REPORT: CPT | Performed by: EMERGENCY MEDICINE

## 2019-02-07 PROCEDURE — 71045 X-RAY EXAM CHEST 1 VIEW: CPT | Performed by: EMERGENCY MEDICINE

## 2019-02-07 PROCEDURE — 80048 BASIC METABOLIC PNL TOTAL CA: CPT | Performed by: EMERGENCY MEDICINE

## 2019-02-07 PROCEDURE — 84484 ASSAY OF TROPONIN QUANT: CPT | Performed by: EMERGENCY MEDICINE

## 2019-02-07 PROCEDURE — 99285 EMERGENCY DEPT VISIT HI MDM: CPT

## 2019-02-07 RX ORDER — LABETALOL HYDROCHLORIDE 5 MG/ML
20 INJECTION, SOLUTION INTRAVENOUS ONCE
Status: COMPLETED | OUTPATIENT
Start: 2019-02-07 | End: 2019-02-07

## 2019-02-07 RX ORDER — ACETAMINOPHEN 500 MG
1000 TABLET ORAL ONCE
Status: COMPLETED | OUTPATIENT
Start: 2019-02-07 | End: 2019-02-07

## 2019-02-08 NOTE — ED PROVIDER NOTES
Patient Seen in: [facility] AND [facility] Emergency Department    History   Patient presents with:  Dizziness (neurologic)    Stated Complaint: LIGHTHEADEDNESS    HPI    55-year-old female with history of hypertension and depression presents with complaints of complaint: LIGHTHEADEDNESS  Other systems are as noted in HPI. Constitutional and vital signs reviewed. All other systems reviewed and negative except as noted above.     Physical Exam     ED Triage Vitals [02/07/19 2034]   BP (!) 194/95   Pulse 65 RAINBOW DRAW LAVENDER TALL (BNP)   CBC W/ DIFFERENTIAL     EKG    Rate, intervals and axes as noted on EKG Report.   Rate: 62  Rhythm: Sinus Rhythm  Axis: Normal  Reading: Nonspecific EKG, no sign of acute change compared to EKG from 11/17/18            C

## 2019-02-22 NOTE — PROGRESS NOTES
HPI:   Patient presents with: Anxiety: anxiety follow up      Jania Junior is a 48year old female presenting for:    Depression/Anxiety  -worsening  -frequent panic attacks.  Tried xanax w/o  Much improvement  -working night shifts by herself has be - 11.0 x10(3) uL    RBC 4.01 3.80 - 5.30 x10(6)uL    HGB 13.4 12.0 - 16.0 g/dL    HCT 40.0 35.0 - 48.0 %    MCV 99.8 80.0 - 100.0 fL    MCH 33.4 26.0 - 34.0 pg    MCHC 33.5 31.0 - 37.0 g/dL    RDW-SD 43.2 35.1 - 46.3 fL    RDW 11.8 11.0 - 15.0 %     Take 1 tablet (81 mg total) by mouth daily. Disp: 30 tablet Rfl: 5   metoprolol Tartrate 25 MG Oral Tab Take 1 tablet (25 mg total) by mouth 2x Daily(Beta Blocker).  Disp: 60 tablet Rfl: 5   atorvastatin 20 MG Oral Tab Take 1 tablet (20 mg total) by mouth n Resp 12   Ht 65\"   Wt 173 lb   BMI 28.79 kg/m²  Estimated body mass index is 28.79 kg/m² as calculated from the following:    Height as of this encounter: 65\". Weight as of this encounter: 173 lb.    Wt Readings from Last 3 Encounters:  02/22/19 : 173 any side effects or complications from the treatments . Red flags/ ER precautions discussed.     Meds & Refills for this Visit:  Requested Prescriptions     Signed Prescriptions Disp Refills   • buPROPion HCl ER, XL, 450 MG Oral Tablet 24 Hr 90 tablet 0

## 2019-02-27 PROBLEM — E78.5 HYPERLIPIDEMIA: Status: ACTIVE | Noted: 2019-02-27

## 2019-03-01 ENCOUNTER — HOSPITAL ENCOUNTER (EMERGENCY)
Facility: HOSPITAL | Age: 54
Discharge: HOME | End: 2019-03-02
Attending: EMERGENCY MEDICINE
Payer: COMMERCIAL

## 2019-03-01 DIAGNOSIS — K29.70 VIRAL GASTRITIS: Primary | ICD-10-CM

## 2019-03-01 PROCEDURE — 99284 EMERGENCY DEPT VISIT MOD MDM: CPT

## 2019-03-01 PROCEDURE — 93005 ELECTROCARDIOGRAM TRACING: CPT

## 2019-03-01 PROCEDURE — 96375 TX/PRO/DX INJ NEW DRUG ADDON: CPT

## 2019-03-01 PROCEDURE — 96374 THER/PROPH/DIAG INJ IV PUSH: CPT

## 2019-03-01 PROCEDURE — 96361 HYDRATE IV INFUSION ADD-ON: CPT

## 2019-03-01 PROCEDURE — 93010 ELECTROCARDIOGRAM REPORT: CPT | Performed by: EMERGENCY MEDICINE

## 2019-03-02 VITALS
WEIGHT: 165 LBS | OXYGEN SATURATION: 99 % | SYSTOLIC BLOOD PRESSURE: 152 MMHG | HEIGHT: 65 IN | DIASTOLIC BLOOD PRESSURE: 91 MMHG | BODY MASS INDEX: 27.49 KG/M2 | RESPIRATION RATE: 20 BRPM | HEART RATE: 61 BPM

## 2019-03-02 LAB
ANION GAP SERPL CALC-SCNC: 9 MMOL/L (ref 0–18)
BASOPHILS # BLD AUTO: 0.03 X10(3) UL (ref 0–0.2)
BASOPHILS NFR BLD AUTO: 0.5 %
BUN BLD-MCNC: 15 MG/DL (ref 7–18)
BUN/CREAT SERPL: 13.9 (ref 10–20)
CALCIUM BLD-MCNC: 9.5 MG/DL (ref 8.5–10.1)
CHLORIDE SERPL-SCNC: 104 MMOL/L (ref 98–107)
CO2 SERPL-SCNC: 24 MMOL/L (ref 21–32)
CREAT BLD-MCNC: 1.08 MG/DL (ref 0.55–1.02)
DEPRECATED RDW RBC AUTO: 40.8 FL (ref 35.1–46.3)
EOSINOPHIL # BLD AUTO: 0.07 X10(3) UL (ref 0–0.7)
EOSINOPHIL NFR BLD AUTO: 1.1 %
ERYTHROCYTE [DISTWIDTH] IN BLOOD BY AUTOMATED COUNT: 11.8 % (ref 11–15)
GLUCOSE BLD-MCNC: 125 MG/DL (ref 70–99)
HCT VFR BLD AUTO: 37.9 % (ref 35–48)
HGB BLD-MCNC: 13.3 G/DL (ref 12–16)
IMM GRANULOCYTES # BLD AUTO: 0.01 X10(3) UL (ref 0–1)
IMM GRANULOCYTES NFR BLD: 0.2 %
LYMPHOCYTES # BLD AUTO: 2.19 X10(3) UL (ref 1–4)
LYMPHOCYTES NFR BLD AUTO: 35.7 %
MCH RBC QN AUTO: 33.4 PG (ref 26–34)
MCHC RBC AUTO-ENTMCNC: 35.1 G/DL (ref 31–37)
MCV RBC AUTO: 95.2 FL (ref 80–100)
MONOCYTES # BLD AUTO: 0.36 X10(3) UL (ref 0.1–1)
MONOCYTES NFR BLD AUTO: 5.9 %
NEUTROPHILS # BLD AUTO: 3.47 X10 (3) UL (ref 1.5–7.7)
NEUTROPHILS # BLD AUTO: 3.47 X10(3) UL (ref 1.5–7.7)
NEUTROPHILS NFR BLD AUTO: 56.6 %
OSMOLALITY SERPL CALC.SUM OF ELEC: 286 MOSM/KG (ref 275–295)
PLATELET # BLD AUTO: 269 10(3)UL (ref 150–450)
POTASSIUM SERPL-SCNC: 4.1 MMOL/L (ref 3.5–5.1)
RBC # BLD AUTO: 3.98 X10(6)UL (ref 3.8–5.3)
SODIUM SERPL-SCNC: 137 MMOL/L (ref 136–145)
TROPONIN I SERPL-MCNC: <0.045 NG/ML (ref ?–0.04)
TROPONIN I SERPL-MCNC: <0.045 NG/ML (ref ?–0.04)
WBC # BLD AUTO: 6.1 X10(3) UL (ref 4–11)

## 2019-03-02 PROCEDURE — 93010 ELECTROCARDIOGRAM REPORT: CPT | Performed by: EMERGENCY MEDICINE

## 2019-03-02 PROCEDURE — 85025 COMPLETE CBC W/AUTO DIFF WBC: CPT | Performed by: EMERGENCY MEDICINE

## 2019-03-02 PROCEDURE — 84484 ASSAY OF TROPONIN QUANT: CPT | Performed by: EMERGENCY MEDICINE

## 2019-03-02 PROCEDURE — 93005 ELECTROCARDIOGRAM TRACING: CPT

## 2019-03-02 PROCEDURE — 80048 BASIC METABOLIC PNL TOTAL CA: CPT | Performed by: EMERGENCY MEDICINE

## 2019-03-02 RX ORDER — METOCLOPRAMIDE HYDROCHLORIDE 5 MG/ML
5 INJECTION INTRAMUSCULAR; INTRAVENOUS ONCE
Status: COMPLETED | OUTPATIENT
Start: 2019-03-02 | End: 2019-03-02

## 2019-03-02 RX ORDER — ONDANSETRON 2 MG/ML
4 INJECTION INTRAMUSCULAR; INTRAVENOUS ONCE
Status: COMPLETED | OUTPATIENT
Start: 2019-03-02 | End: 2019-03-02

## 2019-03-02 RX ORDER — ONDANSETRON 2 MG/ML
INJECTION INTRAMUSCULAR; INTRAVENOUS
Status: COMPLETED
Start: 2019-03-02 | End: 2019-03-02

## 2019-03-02 RX ORDER — METOCLOPRAMIDE 10 MG/1
10 TABLET ORAL 3 TIMES DAILY PRN
Qty: 20 TABLET | Refills: 0 | Status: SHIPPED | OUTPATIENT
Start: 2019-03-02 | End: 2019-04-01

## 2019-03-02 NOTE — ED PROVIDER NOTES
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 Constitutional and vital signs reviewed.       All other -----------         ------                     CBC W/ DIFFERENTIAL[[id number]]                              Final result                 Please view results for these tests on the individual orders.    [address]

## 2019-03-04 ENCOUNTER — TELEPHONE (OUTPATIENT)
Dept: FAMILY MEDICINE CLINIC | Facility: CLINIC | Age: 54
End: 2019-03-04

## 2019-03-04 NOTE — TELEPHONE ENCOUNTER
Patient is calling in regards to some paper work for [employer]. She states she dropped off paperwork on 02/22/2019 when she was seen at the [facility].  Wants to know status and if they are ready to be picked up

## 2019-03-14 ENCOUNTER — TELEPHONE (OUTPATIENT)
Dept: FAMILY MEDICINE CLINIC | Facility: CLINIC | Age: 54
End: 2019-03-14

## 2019-03-14 NOTE — TELEPHONE ENCOUNTER
Patient called back to give the start date on her fmla. It will be 03/12/2019 Tuesday. If any questions please give her a call back.

## 2019-03-15 ENCOUNTER — TELEPHONE (OUTPATIENT)
Dept: FAMILY MEDICINE CLINIC | Facility: CLINIC | Age: 54
End: 2019-03-15

## 2019-04-05 ENCOUNTER — TELEPHONE (OUTPATIENT)
Dept: FAMILY MEDICINE CLINIC | Facility: CLINIC | Age: 54
End: 2019-04-05

## 2019-04-05 NOTE — TELEPHONE ENCOUNTER
Yessica from disability department in [place] called regarding Short Term paperwork that was faxed a few days a go and would like to know if Dr. Demetrios Heimlich received them. If NOT please give Bo Benoit a call back she needs this paperwork asap .  You can contact Bridget Palmer

## 2019-04-08 NOTE — PROGRESS NOTES
HPI:   Patient presents with:  Medication Follow-Up  Depression      Markel Stock is a 48year old female presenting for:    Depression/Anxiety  -worsening  -has been on FMLA for 1mo  -not improving  -multiple crying spells and panic attack  -seeing Neutrophil Absolute 3.47 1.50 - 7.70 x10(3) uL    Lymphocyte Absolute 2.19 1.00 - 4.00 x10(3) uL    Monocyte Absolute 0.36 0.10 - 1.00 x10(3) uL    Eosinophil Absolute 0.07 0.00 - 0.70 x10(3) uL    Basophil Absolute 0.03 0.00 - 0.20 x10(3) uL    Immature G Abdominal pain November 2018   • Anxiety    • Belching October 2018   • Bloating October 2018   • Chest pain November 2018   • Depression    • Essential hypertension    • Flatulence/gas pain/belching October 2018   • Food intolerance unknown    milk intole : 173 lb      Physical Exam   Constitutional: She appears well-developed and well-nourished. No distress. HENT:   Mouth/Throat: Oropharynx is clear and moist.   Eyes: Pupils are equal, round, and reactive to light.  Conjunctivae are normal.   Cardiovascul Visit:  Requested Prescriptions      No prescriptions requested or ordered in this encounter       No orders of the defined types were placed in this encounter.       Imaging & Consults:  Nilay Stark Maintenance:  Mammogram due on 06/17/1965  Tapan Dejesus

## 2019-08-27 ENCOUNTER — TELEPHONE (OUTPATIENT)
Dept: CARDIOLOGY | Age: 54
End: 2019-08-27

## 2022-04-21 ENCOUNTER — TELEPHONE (OUTPATIENT)
Dept: INTERNAL MEDICINE CLINIC | Facility: CLINIC | Age: 57
End: 2022-04-21

## (undated) NOTE — LETTER
Date: 11/19/2018    Patient Name: Mecca Neri          To Whom it may concern: This letter has been written at the patient's request. The above patient was seen at the [facility] for treatment of a medical condition.     This patient pascale

## (undated) NOTE — LETTER
Date & Time: 3/2/2019, 2:56 AM  Patient: Jania Junior  Encounter Provider(s):    Mary Herron MD       To Whom It May Concern:    Sri Haynes was seen and treated in our department on 3/1/2019. She should not return to work until 3/4/19.     If

## (undated) NOTE — LETTER
Date & Time: 12/13/2018, 3:07 PM  Patient: Nazia Mcgill  Encounter Provider(s):    Darius Goodman DO       To Whom It May Concern:    Leyla Carrington was seen and treated in our department on 12/13/2018.     If you have any questions or concerns, please

## (undated) NOTE — ED AVS SNAPSHOT
Janet See   MRN: [id number]    Department:  [facility] Emergency Department   Date of Visit:  12/13/2018           Disclosure     Insurance plans vary and the physician(s) referred by the ER may not be covered by your plan.  Please contact y tell this physician (or your personal doctor if your instructions are to return to your personal doctor) about any new or lasting problems. The primary care or specialist physician will see patients referred from the [facility] Emergency Department.  Juvenal Brandt

## (undated) NOTE — ED AVS SNAPSHOT
Fátima Connell   MRN: [id number]    Department:  [facility] Emergency Department   Date of Visit:  2/7/2019           Disclosure     Insurance plans vary and the physician(s) referred by the ER may not be covered by your plan.  Please contact CARE PHYSICIAN AT ONCE OR RETURN IMMEDIATELY TO THE EMERGENCY DEPARTMENT. If you have been prescribed any medication(s), please fill your prescription right away and begin taking the medication(s) as directed.   If you believe that any of the medications

## (undated) NOTE — ED AVS SNAPSHOT
Robby Aviva   MRN: [id number]    Department:  [facility] Emergency Department   Date of Visit:  3/1/2019           Disclosure     Insurance plans vary and the physician(s) referred by the ER may not be covered by your plan.  Please contact CARE PHYSICIAN AT ONCE OR RETURN IMMEDIATELY TO THE EMERGENCY DEPARTMENT. If you have been prescribed any medication(s), please fill your prescription right away and begin taking the medication(s) as directed.   If you believe that any of the medications